# Patient Record
Sex: MALE | Race: WHITE
[De-identification: names, ages, dates, MRNs, and addresses within clinical notes are randomized per-mention and may not be internally consistent; named-entity substitution may affect disease eponyms.]

---

## 2017-03-05 ENCOUNTER — HOSPITAL ENCOUNTER (EMERGENCY)
Dept: HOSPITAL 4 - SED | Age: 67
Discharge: HOME | End: 2017-03-05
Payer: COMMERCIAL

## 2017-03-05 VITALS
RESPIRATION RATE: 16 BRPM | HEART RATE: 77 BPM | TEMPERATURE: 98 F | DIASTOLIC BLOOD PRESSURE: 68 MMHG | OXYGEN SATURATION: 96 % | SYSTOLIC BLOOD PRESSURE: 140 MMHG

## 2017-03-05 VITALS
OXYGEN SATURATION: 96 % | RESPIRATION RATE: 16 BRPM | SYSTOLIC BLOOD PRESSURE: 132 MMHG | DIASTOLIC BLOOD PRESSURE: 64 MMHG | HEART RATE: 78 BPM | TEMPERATURE: 98.2 F

## 2017-03-05 VITALS — WEIGHT: 174 LBS | BODY MASS INDEX: 22.33 KG/M2 | HEIGHT: 74 IN

## 2017-03-05 DIAGNOSIS — Z86.59: ICD-10-CM

## 2017-03-05 DIAGNOSIS — I10: ICD-10-CM

## 2017-03-05 DIAGNOSIS — R33.9: Primary | ICD-10-CM

## 2017-03-05 DIAGNOSIS — N40.0: ICD-10-CM

## 2017-03-05 LAB
ALBUMIN SERPL BCP-MCNC: 3.5 G/DL (ref 3.4–4.8)
ALT SERPL W P-5'-P-CCNC: 29 U/L (ref 12–78)
ANION GAP SERPL CALCULATED.3IONS-SCNC: 4 MMOL/L (ref 5–15)
APPEARANCE UR: CLEAR
AST SERPL W P-5'-P-CCNC: 14 U/L (ref 10–37)
BASOPHILS # BLD AUTO: 0 K/UL (ref 0–0.2)
BASOPHILS NFR BLD AUTO: 0.5 % (ref 0–2)
BILIRUB SERPL-MCNC: 0.4 MG/DL (ref 0–1)
BILIRUB UR QL STRIP: NEGATIVE
BUN SERPL-MCNC: 21 MG/DL (ref 8–21)
CALCIUM SERPL-MCNC: 9.1 MG/DL (ref 8.4–11)
CHLORIDE SERPL-SCNC: 103 MMOL/L (ref 98–107)
COLOR UR: YELLOW
CREAT SERPL-MCNC: 1.04 MG/DL (ref 0.55–1.3)
EOSINOPHIL # BLD AUTO: 0.1 K/UL (ref 0–0.4)
EOSINOPHIL NFR BLD AUTO: 1.2 % (ref 0–4)
ERYTHROCYTE [DISTWIDTH] IN BLOOD BY AUTOMATED COUNT: 13 % (ref 9–15)
GFR SERPL CREATININE-BSD FRML MDRD: 91 ML/MIN (ref 90–?)
GLUCOSE SERPL-MCNC: 102 MG/DL (ref 70–99)
GLUCOSE UR STRIP-MCNC: NEGATIVE MG/DL
HCT VFR BLD AUTO: 47 % (ref 36–54)
HGB BLD-MCNC: 15.5 G/DL (ref 14–18)
HGB UR QL STRIP: NEGATIVE
INR PPP: 1 (ref 0.8–1.2)
KETONES UR STRIP-MCNC: NEGATIVE MG/DL
LEUKOCYTE ESTERASE UR QL STRIP: NEGATIVE
LYMPHOCYTES # BLD AUTO: 1.7 K/UL (ref 1–5.5)
LYMPHOCYTES NFR BLD AUTO: 18.6 % (ref 20.5–51.5)
MCH RBC QN AUTO: 30 PG (ref 27–31)
MCHC RBC AUTO-ENTMCNC: 33 % (ref 32–36)
MCV RBC AUTO: 92 FL (ref 79–98)
MONOCYTES # BLD MANUAL: 1.1 K/UL (ref 0–1)
MONOCYTES # BLD MANUAL: 11.9 % (ref 1.7–9.3)
NEUTROPHILS # BLD AUTO: 6.1 K/UL (ref 1.8–7.7)
NEUTROPHILS NFR BLD AUTO: 67.8 % (ref 40–70)
NITRITE UR QL STRIP: NEGATIVE
PH UR STRIP: 5.5 [PH] (ref 5–8)
PLATELET # BLD AUTO: 203 K/UL (ref 130–430)
POTASSIUM SERPL-SCNC: 4 MMOL/L (ref 3.5–5.1)
PROT SERPL-MCNC: 7.4 G/DL (ref 6.4–8.3)
PROT UR QL STRIP: NEGATIVE
PROTHROMBIN TIME: 10.9 SECS (ref 9.5–12.5)
RBC # BLD AUTO: 5.14 MIL/UL (ref 4.2–6.2)
SODIUM SERPLBLD-SCNC: 137 MMOL/L (ref 136–145)
SP GR UR STRIP: >=1.03 (ref 1–1.03)
UROBILINOGEN UR STRIP-MCNC: 0.2 MG/DL (ref 0.2–1)
WBC # BLD AUTO: 9 K/UL (ref 4.8–10.8)

## 2017-03-05 PROCEDURE — 93005 ELECTROCARDIOGRAM TRACING: CPT

## 2017-03-05 PROCEDURE — 80053 COMPREHEN METABOLIC PANEL: CPT

## 2017-03-05 PROCEDURE — 96375 TX/PRO/DX INJ NEW DRUG ADDON: CPT

## 2017-03-05 PROCEDURE — 96374 THER/PROPH/DIAG INJ IV PUSH: CPT

## 2017-03-05 PROCEDURE — 99285 EMERGENCY DEPT VISIT HI MDM: CPT

## 2017-03-05 PROCEDURE — 83605 ASSAY OF LACTIC ACID: CPT

## 2017-03-05 PROCEDURE — 85025 COMPLETE CBC W/AUTO DIFF WBC: CPT

## 2017-03-05 PROCEDURE — 51702 INSERT TEMP BLADDER CATH: CPT

## 2017-03-05 PROCEDURE — 96361 HYDRATE IV INFUSION ADD-ON: CPT

## 2017-03-05 PROCEDURE — 71010: CPT

## 2017-03-05 PROCEDURE — 85610 PROTHROMBIN TIME: CPT

## 2017-03-05 PROCEDURE — 85730 THROMBOPLASTIN TIME PARTIAL: CPT

## 2017-03-05 PROCEDURE — 81003 URINALYSIS AUTO W/O SCOPE: CPT

## 2017-03-05 PROCEDURE — 87040 BLOOD CULTURE FOR BACTERIA: CPT

## 2017-03-05 PROCEDURE — 36415 COLL VENOUS BLD VENIPUNCTURE: CPT

## 2017-03-05 NOTE — NUR
Pt bib family c/o urinary retention greater 24hr. Pt has h/o Alzheimer's. 
Family at [facility] for additional hx.

## 2017-03-05 NOTE — NUR
# 16 FR Stovall catheter with use of sterile technique. Immediate return of 200 
cc clear yellow urine noted.  Bedside drainage bag placed below level of 
bladder.  Urine sample collected and sent to lab.  Pt tolerated procedure well.



Patient arrived with stovall in place, changed due to standard of practice prior 
to admission.



Patient unable to toilet self.

## 2017-03-05 NOTE — NUR
Patient and family given written and verbal discharge instructions and 
verbalizes understanding.  ER MD discussed with patient the results and 
treatment provided. Given copies of tests performed in ER. Patient in stable 
condition. ID arm band removed. IV catheter removed intact and dressing 
applied, no active bleeding.

Rx of ativan,flomax given. Patient educated on pain management and to follow up 
with PMD. Pain Scale 0.

Opportunity for questions provided and answered.

## 2017-03-05 NOTE — NUR
Pt uncooperative stovall placement at this time.Will attempt to educate pt. 
Family verbalized understanding and need for placement.

## 2018-10-31 ENCOUNTER — HOSPITAL ENCOUNTER (OUTPATIENT)
Dept: HOSPITAL 4 - SRD | Age: 68
Discharge: HOME | End: 2018-10-31
Attending: UROLOGY
Payer: COMMERCIAL

## 2018-10-31 DIAGNOSIS — I70.8: Primary | ICD-10-CM
